# Patient Record
Sex: MALE
[De-identification: names, ages, dates, MRNs, and addresses within clinical notes are randomized per-mention and may not be internally consistent; named-entity substitution may affect disease eponyms.]

---

## 2021-08-02 PROBLEM — Z00.00 ENCOUNTER FOR PREVENTIVE HEALTH EXAMINATION: Status: ACTIVE | Noted: 2021-08-02

## 2021-08-10 ENCOUNTER — APPOINTMENT (OUTPATIENT)
Dept: UROLOGY | Facility: CLINIC | Age: 31
End: 2021-08-10
Payer: COMMERCIAL

## 2021-08-10 ENCOUNTER — NON-APPOINTMENT (OUTPATIENT)
Age: 31
End: 2021-08-10

## 2021-08-10 ENCOUNTER — TRANSCRIPTION ENCOUNTER (OUTPATIENT)
Age: 31
End: 2021-08-10

## 2021-08-10 VITALS
HEART RATE: 48 BPM | TEMPERATURE: 98 F | HEIGHT: 71 IN | DIASTOLIC BLOOD PRESSURE: 72 MMHG | WEIGHT: 160 LBS | BODY MASS INDEX: 22.4 KG/M2 | SYSTOLIC BLOOD PRESSURE: 121 MMHG

## 2021-08-10 PROCEDURE — 99204 OFFICE O/P NEW MOD 45 MIN: CPT | Mod: 25

## 2021-08-10 PROCEDURE — 93975 VASCULAR STUDY: CPT

## 2021-08-10 NOTE — ASSESSMENT
[FreeTextEntry1] : hypogonadism\par left varicocele\par asthenospermia - 0% motiliy\par no recurrent URI suggestive of immotile cilia but unclear\par baseline TT E2 FSH LH\par recommend repeat SA in 3 months\par if still no motlility will send for sperm viability testing

## 2021-08-10 NOTE — HISTORY OF PRESENT ILLNESS
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 \par  to Kaia Proctor (26F) comes in with failure to concieve x 16 months\par negative female factor evaluation\par no ED. no ejaculatory dysufnciotn. good lbiido\par good energy level\par no additional complaints\par SA x2:\par 7/15 3ml/6.8 mil/ml/0% motility/2% morphology\par 7/27 2.3 ml/6 mil/ml/0% motility/2% morphology\par

## 2021-08-13 LAB
ESTRADIOL SERPL-MCNC: 16 PG/ML
FSH SERPL-MCNC: 10.1 IU/L
LH SERPL-ACNC: 4.7 IU/L
TESTOST BND SERPL-MCNC: 8.1 PG/ML
TESTOSTERONE TOTAL S: 679 NG/DL

## 2021-08-18 ENCOUNTER — TRANSCRIPTION ENCOUNTER (OUTPATIENT)
Age: 31
End: 2021-08-18

## 2021-08-18 ENCOUNTER — NON-APPOINTMENT (OUTPATIENT)
Age: 31
End: 2021-08-18

## 2021-11-02 ENCOUNTER — APPOINTMENT (OUTPATIENT)
Dept: UROLOGY | Facility: CLINIC | Age: 31
End: 2021-11-02
Payer: COMMERCIAL

## 2021-11-02 VITALS
HEIGHT: 71 IN | BODY MASS INDEX: 21 KG/M2 | TEMPERATURE: 97.3 F | SYSTOLIC BLOOD PRESSURE: 125 MMHG | HEART RATE: 55 BPM | DIASTOLIC BLOOD PRESSURE: 72 MMHG | WEIGHT: 150 LBS

## 2021-11-02 PROCEDURE — 99214 OFFICE O/P EST MOD 30 MIN: CPT

## 2021-11-02 NOTE — HISTORY OF PRESENT ILLNESS
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 \par  to Kaia Proctor (26F) comes in with failure to conceive x 16 months\par negative female factor evaluation\par no ED. no ejaculatory dysfunction. good libido\par   Aug 2021\par E2 16\par FSH 10.1 \par LH 4.7\par good energy level\par no additional complaints\par \par SA X3: \par 7/15/21 -  3ml/ 6.8 mil/ml/ 0% motility/ 2% morphology\par 7/27/21 -  2.3 ml/ 6 mil/ml/ 0% motility/ 2% morphology\par 10/28/21 - 1.40 ml / 5.2 mil/ml / 0% / N/A % morphology \par

## 2021-11-02 NOTE — PHYSICAL EXAM
[Urethral Meatus] : meatus normal [Penis Abnormality] : normal circumcised penis [Scrotum] : the scrotum was normal [FreeTextEntry1] : Left grade 1 varicocele

## 2021-11-02 NOTE — ASSESSMENT
[FreeTextEntry1] : hypogonadism\par left varicocele\par Repeat SA with 0% motility -Oct 28, 2021 (sunrise)\par TT - 679  Aug 2021\par recommend repeat SA in a different lab and if no motility, sperm viability testing. \par if necrospermia for TESE\par \par Follow up after SA results. \par \par

## 2021-11-11 ENCOUNTER — TRANSCRIPTION ENCOUNTER (OUTPATIENT)
Age: 31
End: 2021-11-11

## 2021-11-16 ENCOUNTER — TRANSCRIPTION ENCOUNTER (OUTPATIENT)
Age: 31
End: 2021-11-16

## 2021-11-18 ENCOUNTER — NON-APPOINTMENT (OUTPATIENT)
Age: 31
End: 2021-11-18

## 2021-11-23 ENCOUNTER — NON-APPOINTMENT (OUTPATIENT)
Age: 31
End: 2021-11-23

## 2021-12-09 ENCOUNTER — APPOINTMENT (OUTPATIENT)
Dept: UROLOGY | Facility: CLINIC | Age: 31
End: 2021-12-09
Payer: COMMERCIAL

## 2021-12-09 VITALS — TEMPERATURE: 98.3 F | HEART RATE: 63 BPM | DIASTOLIC BLOOD PRESSURE: 75 MMHG | SYSTOLIC BLOOD PRESSURE: 128 MMHG

## 2021-12-09 PROCEDURE — 99213 OFFICE O/P EST LOW 20 MIN: CPT

## 2021-12-09 NOTE — HISTORY OF PRESENT ILLNESS
[FreeTextEntry1] : repeat SA:\par 2.0 ml/14.9 mil/ml/49% motiliyt/morpholgy 2% \par now stopped marijuna\par grade I varicocele\par

## 2021-12-09 NOTE — ASSESSMENT
[FreeTextEntry1] : hypogonadism\par reassuring\par repeat SA 3 months\par consider IUI\par f/u after SA

## 2022-03-10 ENCOUNTER — APPOINTMENT (OUTPATIENT)
Dept: UROLOGY | Facility: CLINIC | Age: 32
End: 2022-03-10
Payer: COMMERCIAL

## 2022-03-10 VITALS — SYSTOLIC BLOOD PRESSURE: 127 MMHG | DIASTOLIC BLOOD PRESSURE: 77 MMHG | TEMPERATURE: 98.6 F | HEART RATE: 71 BPM

## 2022-03-10 DIAGNOSIS — E29.1 TESTICULAR HYPOFUNCTION: ICD-10-CM

## 2022-03-10 DIAGNOSIS — N50.812 LEFT TESTICULAR PAIN: ICD-10-CM

## 2022-03-10 PROCEDURE — 99214 OFFICE O/P EST MOD 30 MIN: CPT

## 2022-03-10 NOTE — HISTORY OF PRESENT ILLNESS
[None] : None [FreeTextEntry1] : 31 yr old male with Hypogonadism \par Complaints of intermittent light throbbing/shooting for 6 months\par past month mor periodic with no exacerbating activity \par grade I varicocele\par stopped marijuana for 120 days. \par good libido, \par No ED\par \par SA: Dec 2021 - 2.0 ml/14.9 mil/ml /49% motiliyt / morpholgy 2% \par SA Mar 8, 2022 - 1.30 ml / 8.0mil/ml /0%  motility/ 2% morph \par \par mutliple previous analses with zero motility

## 2022-03-10 NOTE — ASSESSMENT
[FreeTextEntry1] : asthenospermia vs necropsermia\par ?immotile cilia\par no respiratory infectios in past\par SA at [facility] 12/2021 - treated - poor progression but motility noted\par repeat at [facility] 2 days ago no motility\par will repeat SA at [facility] next week\par proceed to ivf if motility \par consider TESE\par \par grade I varicocele\par measrued at 2.8\par clear noted on exam\par repeat sonogram\par \par

## 2022-03-10 NOTE — PHYSICAL EXAM
[Urethral Meatus] : meatus normal [Urinary Bladder Findings] : the bladder was normal on palpation [Scrotum] : the scrotum was normal [FreeTextEntry1] : skin rash , left grade 1 varicocele, bilateral  8cc testis,

## 2022-03-18 ENCOUNTER — APPOINTMENT (OUTPATIENT)
Dept: UROLOGY | Facility: CLINIC | Age: 32
End: 2022-03-18

## 2022-04-01 ENCOUNTER — APPOINTMENT (OUTPATIENT)
Dept: UROLOGY | Facility: CLINIC | Age: 32
End: 2022-04-01
Payer: COMMERCIAL

## 2022-04-01 ENCOUNTER — LABORATORY RESULT (OUTPATIENT)
Age: 32
End: 2022-04-01

## 2022-04-01 VITALS — HEART RATE: 56 BPM | TEMPERATURE: 98.5 F | DIASTOLIC BLOOD PRESSURE: 77 MMHG | SYSTOLIC BLOOD PRESSURE: 128 MMHG

## 2022-04-01 DIAGNOSIS — I86.1 SCROTAL VARICES: ICD-10-CM

## 2022-04-01 PROCEDURE — 99214 OFFICE O/P EST MOD 30 MIN: CPT | Mod: 25

## 2022-04-01 PROCEDURE — 93975 VASCULAR STUDY: CPT

## 2022-04-01 NOTE — HISTORY OF PRESENT ILLNESS
[FreeTextEntry1] : repeat SA at [facility] sperm noted\par total motile 12 mil\par scrotal sono confirms 2.9mm left v[place]oele\par here to discuss

## 2022-04-01 NOTE — ASSESSMENT
[FreeTextEntry1] : left varicocele\par role IUI/ART discussed\par will proceed\par wants concurrent varicocelectomy\par We had an in depth conversation regarding the benefit of varicocelectomy today. He understands the literature which overwhelming reports a benefit in improvement in semen parameters. He also understands that there is limited data in terms of spontaneous pregnancy and take home baby rates.There is good data suggesting improved semen parameters which can possibly obviate the need for future IVF and has been shown to improve IVF outcomes in selected patients. We discussed the recent results of a [facility] [place] review which reported that one natural pregnancy is achieved for every 3-5 varicocele repairs over an unknown time frame. We also spoke about the fact that there is a 3 to 6 month delay before improvement is seen in semen parameters. The role of advancing maternal age was discussed in depth.\par \par Surgical risks, including a risk of infection, injury to the testicular artery (extremely rare), injury to the vas deferens and hydrocele were discussed, as was post operative pain and pain control. Post operative skin numbness in the anterior thigh/lateral scrotum were also discussed.\par will shceduel\par

## 2022-04-04 LAB
ANION GAP SERPL CALC-SCNC: 12 MMOL/L
APPEARANCE: CLEAR
APTT BLD: 33.7 SEC
BACTERIA UR CULT: NORMAL
BACTERIA: NEGATIVE
BASOPHILS # BLD AUTO: 0.03 K/UL
BASOPHILS NFR BLD AUTO: 0.6 %
BILIRUBIN URINE: NEGATIVE
BLOOD URINE: NEGATIVE
BUN SERPL-MCNC: 16 MG/DL
CALCIUM OXALATE CRYSTALS: ABNORMAL
CALCIUM SERPL-MCNC: 9.9 MG/DL
CHLORIDE SERPL-SCNC: 103 MMOL/L
CO2 SERPL-SCNC: 26 MMOL/L
COLOR: YELLOW
CREAT SERPL-MCNC: 1.02 MG/DL
EGFR: 101 ML/MIN/1.73M2
EOSINOPHIL # BLD AUTO: 0.15 K/UL
EOSINOPHIL NFR BLD AUTO: 3.2 %
GLUCOSE QUALITATIVE U: NEGATIVE
GLUCOSE SERPL-MCNC: 79 MG/DL
HCT VFR BLD CALC: 39.7 %
HGB BLD-MCNC: 11.8 G/DL
HYALINE CASTS: 0 /LPF
IMM GRANULOCYTES NFR BLD AUTO: 0.4 %
INR PPP: 1.03 RATIO
KETONES URINE: NEGATIVE
LEUKOCYTE ESTERASE URINE: NEGATIVE
LYMPHOCYTES # BLD AUTO: 1.5 K/UL
LYMPHOCYTES NFR BLD AUTO: 32.3 %
MAN DIFF?: NORMAL
MCHC RBC-ENTMCNC: 20.1 PG
MCHC RBC-ENTMCNC: 29.7 GM/DL
MCV RBC AUTO: 67.6 FL
MICROSCOPIC-UA: NORMAL
MONOCYTES # BLD AUTO: 0.39 K/UL
MONOCYTES NFR BLD AUTO: 8.4 %
NEUTROPHILS # BLD AUTO: 2.56 K/UL
NEUTROPHILS NFR BLD AUTO: 55.1 %
NITRITE URINE: NEGATIVE
PH URINE: 6.5
PLATELET # BLD AUTO: 179 K/UL
POTASSIUM SERPL-SCNC: 4.6 MMOL/L
PROTEIN URINE: NEGATIVE
PT BLD: 12.1 SEC
RBC # BLD: 5.87 M/UL
RBC # FLD: 14.8 %
RED BLOOD CELLS URINE: 2 /HPF
SODIUM SERPL-SCNC: 141 MMOL/L
SPECIFIC GRAVITY URINE: 1.03
SQUAMOUS EPITHELIAL CELLS: 1 /HPF
UROBILINOGEN URINE: NORMAL
WBC # FLD AUTO: 4.65 K/UL
WHITE BLOOD CELLS URINE: 0 /HPF

## 2022-04-27 ENCOUNTER — APPOINTMENT (OUTPATIENT)
Dept: UROLOGY | Facility: AMBULATORY SURGERY CENTER | Age: 32
End: 2022-04-27